# Patient Record
Sex: MALE | Race: WHITE | Employment: OTHER | URBAN - METROPOLITAN AREA
[De-identification: names, ages, dates, MRNs, and addresses within clinical notes are randomized per-mention and may not be internally consistent; named-entity substitution may affect disease eponyms.]

---

## 2023-09-27 ENCOUNTER — HOSPITAL ENCOUNTER (EMERGENCY)
Facility: HOSPITAL | Age: 68
Discharge: HOME | End: 2023-09-27
Attending: EMERGENCY MEDICINE
Payer: COMMERCIAL

## 2023-09-27 ENCOUNTER — APPOINTMENT (OUTPATIENT)
Dept: GENERAL RADIOLOGY | Facility: HOSPITAL | Age: 68
End: 2023-09-27
Attending: EMERGENCY MEDICINE
Payer: COMMERCIAL

## 2023-09-27 VITALS
TEMPERATURE: 98 F | DIASTOLIC BLOOD PRESSURE: 72 MMHG | HEIGHT: 75 IN | BODY MASS INDEX: 27.98 KG/M2 | WEIGHT: 225 LBS | OXYGEN SATURATION: 97 % | SYSTOLIC BLOOD PRESSURE: 144 MMHG | HEART RATE: 70 BPM | RESPIRATION RATE: 20 BRPM
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
## 2023-09-27 DIAGNOSIS — S16.1XXA STRAIN OF NECK MUSCLE, INITIAL ENCOUNTER: Primary | ICD-10-CM

## 2023-09-27 PROCEDURE — 72040 X-RAY EXAM NECK SPINE 2-3 VW: CPT | Performed by: EMERGENCY MEDICINE

## 2023-09-27 PROCEDURE — 99284 EMERGENCY DEPT VISIT MOD MDM: CPT

## 2023-09-27 PROCEDURE — 99283 EMERGENCY DEPT VISIT LOW MDM: CPT
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
